# Patient Record
Sex: FEMALE | Race: ASIAN | NOT HISPANIC OR LATINO | Employment: UNEMPLOYED | URBAN - METROPOLITAN AREA
[De-identification: names, ages, dates, MRNs, and addresses within clinical notes are randomized per-mention and may not be internally consistent; named-entity substitution may affect disease eponyms.]

---

## 2022-10-26 ENCOUNTER — HOSPITAL ENCOUNTER (EMERGENCY)
Facility: HOSPITAL | Age: 52
Discharge: HOME | End: 2022-10-26
Admitting: PHYSICIAN ASSISTANT
Payer: COMMERCIAL

## 2022-10-26 VITALS
SYSTOLIC BLOOD PRESSURE: 117 MMHG | HEART RATE: 61 BPM | WEIGHT: 171.96 LBS | DIASTOLIC BLOOD PRESSURE: 67 MMHG | RESPIRATION RATE: 18 BRPM | TEMPERATURE: 98 F | OXYGEN SATURATION: 98 %

## 2022-10-26 DIAGNOSIS — Z77.29 EXPOSURE TO RODENT: ICD-10-CM

## 2022-10-26 PROCEDURE — 99282 EMERGENCY DEPT VISIT SF MDM: CPT

## 2022-10-27 NOTE — ED PROVIDER NOTES
EMERGENCY DEPARTMENT ENCOUNTER      NAME: Jennifer Adams  AGE: 52 year old female  YOB: 1970  MRN: [id number]  EVALUATION DATE & TIME: No admission date for patient encounter.    PCP: No Ref-Primary, Physician    ED PROVIDER: April Joseph PA-C      Chief Complaint   Patient presents with     Body Fluid Exposure         FINAL IMPRESSION:  1. Exposure to rodent          ED COURSE & MEDICAL DECISION MAKIN:37 PM I introduced myself to patient, performed initial HPI and examination.     Jennifer is a 52-year-old female who presents to the emergency department with concern for exposure to a squirrel, concern for rabies.  Patient was walking with her dog yesterday, reportedly the dog picked up an ill squirrel.  Patient and significant other collected the squirrel with a towel and a box, brought it to an emergency vet.  Afterwards patient and significant other began to worry about concern for rabies, prompting ED evaluation.    Denies any true exposure to the squirrel.  No bites or injuries.    Ultimately no significant exposure and animal and question was a squirrel, very low suspicion for exposure to rabies.  At this time, no indication for rabies prophylaxis.  I discussed with patient, who is agreeable.    Instructed on at home management and monitoring, close follow-up with PCP and given ED return precautions.  All questions were answered to the best my ability.      MEDICATIONS GIVEN IN THE EMERGENCY:  Medications - No data to display    NEW PRESCRIPTIONS STARTED AT TODAY'S ER VISIT  There are no discharge medications for this patient.         =================================================================    HPI    Patient information was obtained from: Patient    Use of : N/A       Jennifer Adams is a 52 year old female with no pertinent history on file who presents to this ED via walk in for evaluation of squirrel exposure. Patient was on a walk with dog yesterday. Dog found an  ill squirrel and tossed it around. They took it to an emergency vet, who admitted the squirrel. Now they are concerned about possible exposure to rabies and considering rabies vaccinations.    Patient did not have direct contact with squirrel. No bites.       REVIEW OF SYSTEMS   Review of Systems   Constitutional:        Positive for squirrel exposure.   All other systems reviewed and are negative.      PAST MEDICAL HISTORY:  History reviewed. No pertinent past medical history.    PAST SURGICAL HISTORY:  History reviewed. No pertinent surgical history.    CURRENT MEDICATIONS:    No current outpatient medications on file.      ALLERGIES:  No Known Allergies    FAMILY HISTORY:  History reviewed. No pertinent family history.    SOCIAL HISTORY:   Social History     Socioeconomic History     Marital status:        VITALS:  /67   Pulse 61   Temp 98  F (36.7  C) (Oral)   Resp 18   Wt 78 kg (171 lb 15.3 oz)   SpO2 98%     PHYSICAL EXAM    Constitutional: Well developed, Well nourished, NAD, GCS 15   HENT: Normocephalic, Atraumatic.   Neck- Normal ROM  Eyes: Conjunctiva normal.  Respiratory: No respiratory distress, speaking in full sentences.   Cardiovascular: Normal heart rate   Musculoskeletal: No deformities, Moves all extremities equally.   Integument: Warm, Dry, No erythema, ecchymosis, or rash.  Neurologic: Alert & oriented x 3, Normal sensory function. No focal deficits.   Psychiatric: Affect normal, Judgment normal, Mood normal. Cooperative.      LAB:  All pertinent labs reviewed and interpreted.       RADIOLOGY:  Reviewed all pertinent imaging. Please see official radiology report.  No orders to display       PROCEDURES:   None      Jeannette WEBB, am serving as a scribe to document services personally performed by April Joseph PA-C based on my observation and the provider's statements to me. April WEBB PA-C, attest that Jeannette Moody is acting in a scribe capacity, has observed my  performance of the services and has documented them in accordance with my direction.    April Joseph PA-C  Emergency Medicine  [facility] EMERGENCY DEPARTMENT  [facility] [facility] [id number] [phone]             April Joseph PA-C  10/26/22 0012

## 2022-10-27 NOTE — DISCHARGE INSTRUCTIONS
No need for rabies vaccination at this time given low risk exposure.    Return to the emergency department if you develop any new/concerning symptoms. We would be happy to see you.

## 2022-10-27 NOTE — ED TRIAGE NOTES
Consent: Written consent obtained, risks reviewed including but not limited to crusting, scabbing, blistering, scarring, darker or lighter pigmentary change, bruising, and/or incomplete response.
Encounter with squirrel by touching the animal and the dog that the squirrel fought with. No bite/no scratch. Here for eval of possible rabies shot.     Triage Assessment     Row Name 10/26/22 1904       Triage Assessment (Adult)    Airway WDL WDL       Respiratory WDL    Respiratory WDL WDL       Skin Circulation/Temperature WDL    Skin Circulation/Temperature WDL WDL       Cardiac WDL    Cardiac WDL WDL       Peripheral/Neurovascular WDL    Peripheral Neurovascular WDL WDL       Cognitive/Neuro/Behavioral WDL    Cognitive/Neuro/Behavioral WDL WDL              
Post-Care Instructions: I reviewed with the patient in detail post-care instructions. Patient should stay away from the sun and wear sun protection until treated areas are fully healed.
Vacuum Pressure: 10
Glycolic Acid %: 7.5%
Comments: Discussed Perfect 10 peels.
Detail Level: Generalized
Price (Use Numbers Only, No Special Characters Or $): 99
Number Of Passes: 4
Treatment Number: 2
Indication: acne